# Patient Record
Sex: FEMALE | NOT HISPANIC OR LATINO | Employment: FULL TIME | URBAN - METROPOLITAN AREA
[De-identification: names, ages, dates, MRNs, and addresses within clinical notes are randomized per-mention and may not be internally consistent; named-entity substitution may affect disease eponyms.]

---

## 2023-04-18 PROBLEM — F40.228 AEROPHOBIA: Status: ACTIVE | Noted: 2023-04-18

## 2023-04-18 PROBLEM — O09.899 SHORT INTERVAL BETWEEN PREGNANCIES AFFECTING PREGNANCY, ANTEPARTUM (HHS-HCC): Status: ACTIVE | Noted: 2023-04-18

## 2023-04-18 PROBLEM — J02.9 SORE THROAT: Status: RESOLVED | Noted: 2023-04-18 | Resolved: 2023-04-18

## 2023-04-18 PROBLEM — O24.419 GESTATIONAL DIABETES MELLITUS (GDM) (HHS-HCC): Status: ACTIVE | Noted: 2023-04-18

## 2023-04-18 PROBLEM — J01.90 ACUTE SINUSITIS: Status: RESOLVED | Noted: 2023-04-18 | Resolved: 2023-04-18

## 2023-04-18 PROBLEM — R05.8 POST-VIRAL COUGH SYNDROME: Status: ACTIVE | Noted: 2023-04-18

## 2023-04-18 PROBLEM — O34.219 PREVIOUS CESAREAN DELIVERY AFFECTING PREGNANCY (HHS-HCC): Status: ACTIVE | Noted: 2023-04-18

## 2023-04-18 PROBLEM — J32.9 SINUSITIS: Status: RESOLVED | Noted: 2023-04-18 | Resolved: 2023-04-18

## 2023-04-18 RX ORDER — CETIRIZINE HYDROCHLORIDE 10 MG/1
1 TABLET ORAL NIGHTLY
COMMUNITY
Start: 2022-09-26

## 2023-04-18 RX ORDER — ALPRAZOLAM 0.5 MG/1
TABLET ORAL
COMMUNITY
Start: 2021-07-06

## 2023-04-19 ENCOUNTER — OFFICE VISIT (OUTPATIENT)
Dept: PRIMARY CARE | Facility: CLINIC | Age: 38
End: 2023-04-19
Payer: COMMERCIAL

## 2023-04-19 VITALS
HEART RATE: 79 BPM | DIASTOLIC BLOOD PRESSURE: 62 MMHG | BODY MASS INDEX: 22.66 KG/M2 | HEIGHT: 65 IN | SYSTOLIC BLOOD PRESSURE: 118 MMHG | OXYGEN SATURATION: 99 % | WEIGHT: 136 LBS | TEMPERATURE: 96.9 F

## 2023-04-19 DIAGNOSIS — R59.0 ANTERIOR CERVICAL LYMPHADENOPATHY: Primary | ICD-10-CM

## 2023-04-19 PROCEDURE — 3078F DIAST BP <80 MM HG: CPT | Performed by: INTERNAL MEDICINE

## 2023-04-19 PROCEDURE — 99212 OFFICE O/P EST SF 10 MIN: CPT | Performed by: INTERNAL MEDICINE

## 2023-04-19 PROCEDURE — 3074F SYST BP LT 130 MM HG: CPT | Performed by: INTERNAL MEDICINE

## 2023-04-19 RX ORDER — DIPHENHYDRAMINE HCL 25 MG
25 TABLET ORAL NIGHTLY PRN
Qty: 30 TABLET | Refills: 0 | Status: SHIPPED | OUTPATIENT
Start: 2023-04-19 | End: 2023-05-19

## 2023-04-19 RX ORDER — FERROUS SULFATE 325(65) MG
65 TABLET ORAL
COMMUNITY

## 2023-04-19 ASSESSMENT — ENCOUNTER SYMPTOMS
ABDOMINAL PAIN: 0
PHOTOPHOBIA: 0
CONSTIPATION: 0
FACIAL SWELLING: 0
WHEEZING: 0
SPEECH DIFFICULTY: 0
POLYPHAGIA: 0
SLEEP DISTURBANCE: 0
CHEST TIGHTNESS: 0
APPETITE CHANGE: 0
SORE THROAT: 0
FEVER: 0
WOUND: 0
NAUSEA: 0
WEAKNESS: 0
FLANK PAIN: 0
CONFUSION: 0
BLOOD IN STOOL: 0
FACIAL ASYMMETRY: 0
TROUBLE SWALLOWING: 0
UNEXPECTED WEIGHT CHANGE: 0
HALLUCINATIONS: 0
STRIDOR: 0
POLYDIPSIA: 0
RHINORRHEA: 0
ACTIVITY CHANGE: 0
EYE PAIN: 0
SHORTNESS OF BREATH: 0
DYSURIA: 0
VOMITING: 0
PALPITATIONS: 0
DIZZINESS: 0
FATIGUE: 0
NUMBNESS: 0
ADENOPATHY: 1
SINUS PAIN: 0
HEADACHES: 0
VOICE CHANGE: 0
NERVOUS/ANXIOUS: 0

## 2023-04-19 ASSESSMENT — PAIN SCALES - GENERAL: PAINLEVEL: 0-NO PAIN

## 2023-04-19 ASSESSMENT — PATIENT HEALTH QUESTIONNAIRE - PHQ9
SUM OF ALL RESPONSES TO PHQ9 QUESTIONS 1 AND 2: 0
2. FEELING DOWN, DEPRESSED OR HOPELESS: NOT AT ALL
1. LITTLE INTEREST OR PLEASURE IN DOING THINGS: NOT AT ALL

## 2023-04-19 NOTE — PROGRESS NOTES
"Subjective   Patient ID: Sheeba Bruno is a 37 y.o. female who presents for Swollen Glands (Right sided swelling for the last month ).    HPI   37 year old female presented to the office for swelling of right side of neck approximately which started 5- 6 weeks ago following a sore throat but it never resolved and on and off its slightly painful.    Review of Systems   Constitutional:  Negative for activity change, appetite change, fatigue, fever and unexpected weight change.   HENT:  Negative for dental problem, ear discharge, facial swelling, hearing loss, nosebleeds, postnasal drip, rhinorrhea, sinus pain, sore throat, trouble swallowing and voice change.    Eyes:  Negative for photophobia, pain and visual disturbance.   Respiratory:  Negative for chest tightness, shortness of breath, wheezing and stridor.    Cardiovascular:  Negative for chest pain, palpitations and leg swelling.   Gastrointestinal:  Negative for abdominal pain, blood in stool, constipation, nausea and vomiting.   Endocrine: Negative for polydipsia, polyphagia and polyuria.   Genitourinary:  Negative for decreased urine volume, dyspareunia, dysuria, flank pain and urgency.   Skin:  Negative for rash and wound.   Allergic/Immunologic: Negative for environmental allergies and food allergies.   Neurological:  Negative for dizziness, facial asymmetry, speech difficulty, weakness, numbness and headaches.   Hematological:  Positive for adenopathy.   Psychiatric/Behavioral:  Negative for behavioral problems, confusion, hallucinations and sleep disturbance. The patient is not nervous/anxious.      Objective   /62   Pulse 79   Temp 36.1 °C (96.9 °F)   Ht 1.651 m (5' 5\")   Wt 61.7 kg (136 lb)   SpO2 99%   BMI 22.63 kg/m²     Physical Exam  Constitutional:       General: She is not in acute distress.     Appearance: Normal appearance. She is normal weight. She is not ill-appearing or toxic-appearing.   HENT:      Mouth/Throat:      Mouth: " Mucous membranes are moist.   Eyes:      General:         Right eye: No discharge.         Left eye: No discharge.      Conjunctiva/sclera: Conjunctivae normal.      Pupils: Pupils are equal, round, and reactive to light.   Neck:      Vascular: No carotid bruit.   Cardiovascular:      Rate and Rhythm: Normal rate and regular rhythm.      Pulses: Normal pulses.      Heart sounds: Normal heart sounds. No murmur heard.     No friction rub.   Pulmonary:      Effort: Pulmonary effort is normal. No respiratory distress.      Breath sounds: Normal breath sounds. No wheezing.   Musculoskeletal:         General: Normal range of motion.      Cervical back: Tenderness present.   Lymphadenopathy:      Cervical: Cervical adenopathy present.   Skin:     General: Skin is warm.   Neurological:      General: No focal deficit present.      Mental Status: She is alert and oriented to person, place, and time.      Cranial Nerves: No cranial nerve deficit.   Psychiatric:         Mood and Affect: Mood normal.         Behavior: Behavior normal.         Thought Content: Thought content normal.         Judgment: Judgment normal.       Assessment/Plan   Problem List Items Addressed This Visit    None  Visit Diagnoses       Anterior cervical lymphadenopathy    -  Primary    Relevant Medications    diphenhydrAMINE (Sominex) 25 mg tablet     Return to clinic at your next office visit in 2 weeks for follow up on anterior cervical lymphadenopathy.

## 2023-05-04 ENCOUNTER — OFFICE VISIT (OUTPATIENT)
Dept: PRIMARY CARE | Facility: CLINIC | Age: 38
End: 2023-05-04
Payer: COMMERCIAL

## 2023-05-04 VITALS
OXYGEN SATURATION: 98 % | WEIGHT: 131 LBS | TEMPERATURE: 96.7 F | BODY MASS INDEX: 21.83 KG/M2 | SYSTOLIC BLOOD PRESSURE: 110 MMHG | HEART RATE: 81 BPM | DIASTOLIC BLOOD PRESSURE: 68 MMHG | HEIGHT: 65 IN

## 2023-05-04 DIAGNOSIS — O24.410 DIET CONTROLLED GESTATIONAL DIABETES MELLITUS (GDM), ANTEPARTUM (HHS-HCC): ICD-10-CM

## 2023-05-04 DIAGNOSIS — I88.9 CERVICAL LYMPHADENITIS: Primary | ICD-10-CM

## 2023-05-04 PROCEDURE — 99213 OFFICE O/P EST LOW 20 MIN: CPT | Performed by: FAMILY MEDICINE

## 2023-05-04 PROCEDURE — 3074F SYST BP LT 130 MM HG: CPT | Performed by: FAMILY MEDICINE

## 2023-05-04 PROCEDURE — 3078F DIAST BP <80 MM HG: CPT | Performed by: FAMILY MEDICINE

## 2023-05-04 ASSESSMENT — PAIN SCALES - GENERAL: PAINLEVEL: 0-NO PAIN

## 2023-05-04 NOTE — PROGRESS NOTES
Subjective   Patient ID: Sheeba Bruno is a 37 y.o. female.    Patient is here for follow-up. She has had a few pregnancies back to back.  She has a history of gestational diabetes.  She feels well today.  She has a history of very mild anxiety and takes about 15 alprazolam a year.  She does not have daily anxiety it just hits every once in a while.  She does not need a refill.  She has no chest pain or shortness of breath.  She has a history of swollen lymph node in her neck and saw Dr. Pantoja a couple weeks ago.  It has not gone away.  There is no fever, chills or night sweats.        Review of Systems   Constitutional:  Negative for fatigue, fever and unexpected weight change.   HENT:  Negative for congestion, ear pain, hearing loss, sore throat and trouble swallowing.    Eyes:  Negative for pain and visual disturbance.   Respiratory:  Negative for cough and shortness of breath.    Cardiovascular:  Negative for chest pain, palpitations and leg swelling.   Gastrointestinal:  Negative for abdominal pain, blood in stool, diarrhea, nausea and vomiting.   Genitourinary:  Negative for dysuria, frequency, hematuria and urgency.   Musculoskeletal:  Negative for joint swelling.   Skin:  Negative for pallor and rash.   Neurological:  Negative for dizziness, syncope, weakness, numbness and headaches.   Psychiatric/Behavioral:  Negative for confusion, decreased concentration, hallucinations and suicidal ideas.      Vitals:    05/04/23 1521   BP: 110/68   Pulse: 81   Temp: 35.9 °C (96.7 °F)   SpO2: 98%      Objective   Physical Exam  Constitutional:       Appearance: Normal appearance.   Cardiovascular:      Rate and Rhythm: Normal rate and regular rhythm.      Heart sounds: Normal heart sounds.   Pulmonary:      Effort: Pulmonary effort is normal.      Breath sounds: Normal breath sounds.   Musculoskeletal:      Cervical back: Neck supple.   Lymphadenopathy:      Cervical: Cervical adenopathy present.   Skin:     General:  Skin is warm and dry.   Neurological:      General: No focal deficit present.      Mental Status: She is alert.   Psychiatric:         Mood and Affect: Mood normal.         Speech: Speech normal.         Behavior: Behavior normal.         Cognition and Memory: Cognition normal.         Assessment/Plan   Diagnoses and all orders for this visit:  Cervical lymphadenitis  -     Referral to ENT; Future  -     CT soft tissue neck w and wo IV contrast; Future  Diet controlled gestational diabetes mellitus (GDM), antepartum

## 2023-05-10 ENCOUNTER — APPOINTMENT (OUTPATIENT)
Dept: PRIMARY CARE | Facility: CLINIC | Age: 38
End: 2023-05-10
Payer: COMMERCIAL

## 2023-05-16 ASSESSMENT — ENCOUNTER SYMPTOMS
PALPITATIONS: 0
EYE PAIN: 0
DIARRHEA: 0
NAUSEA: 0
FEVER: 0
BLOOD IN STOOL: 0
DIZZINESS: 0
WEAKNESS: 0
UNEXPECTED WEIGHT CHANGE: 0
SORE THROAT: 0
NUMBNESS: 0
JOINT SWELLING: 0
DYSURIA: 0
COUGH: 0
SHORTNESS OF BREATH: 0
VOMITING: 0
DECREASED CONCENTRATION: 0
FATIGUE: 0
ABDOMINAL PAIN: 0
FREQUENCY: 0
HALLUCINATIONS: 0
HEADACHES: 0
CONFUSION: 0
HEMATURIA: 0
TROUBLE SWALLOWING: 0

## 2023-05-16 NOTE — PATIENT INSTRUCTIONS
It was nice to see you today!  Discussed current concerns and addressed   Reviewed recent labs and diagnostics  Reviewed medications list  Continue to eat a healthy diet, exercise at least 3 times a week or more  Plan and follow up discussed  Will obtain CT of the neck, if anything is suspicious will send to ENT for biopsy.  Check CBC.  For any further information related to your condition, copy and paste or go to [email or web address]

## 2023-06-13 DIAGNOSIS — B37.9 YEAST INFECTION: ICD-10-CM

## 2023-06-13 RX ORDER — FLUCONAZOLE 150 MG/1
TABLET ORAL
Qty: 3 TABLET | Refills: 1 | Status: SHIPPED | OUTPATIENT
Start: 2023-06-13

## 2023-06-13 RX ORDER — FLUCONAZOLE 150 MG/1
150 TABLET ORAL ONCE
COMMUNITY
End: 2023-06-13 | Stop reason: SDUPTHER

## 2023-06-13 NOTE — TELEPHONE ENCOUNTER
Pt recently saw ENT for lump on throat. Given antibiotic. Believes she has a yeast infection. C/o itching and burning in vaginal area.  
1.69

## 2023-12-22 ENCOUNTER — TELEMEDICINE (OUTPATIENT)
Dept: PRIMARY CARE | Facility: CLINIC | Age: 38
End: 2023-12-22
Payer: COMMERCIAL

## 2023-12-22 DIAGNOSIS — J01.00 ACUTE NON-RECURRENT MAXILLARY SINUSITIS: Primary | ICD-10-CM

## 2023-12-22 PROCEDURE — 99213 OFFICE O/P EST LOW 20 MIN: CPT | Performed by: FAMILY MEDICINE

## 2023-12-22 RX ORDER — AMOXICILLIN 875 MG/1
875 TABLET, FILM COATED ORAL 2 TIMES DAILY
Qty: 20 TABLET | Refills: 0 | Status: SHIPPED | OUTPATIENT
Start: 2023-12-22 | End: 2024-01-01

## 2023-12-22 RX ORDER — FLUCONAZOLE 150 MG/1
TABLET ORAL
Qty: 3 TABLET | Refills: 0 | Status: SHIPPED | OUTPATIENT
Start: 2023-12-22

## 2023-12-22 ASSESSMENT — ENCOUNTER SYMPTOMS
NEUROLOGICAL NEGATIVE: 1
SINUS PRESSURE: 1
FATIGUE: 1
BLOOD IN STOOL: 0
CHILLS: 1
CHEST TIGHTNESS: 0
ABDOMINAL PAIN: 0
COUGH: 1
WHEEZING: 0
VOMITING: 0
DIARRHEA: 0
RHINORRHEA: 1
NAUSEA: 0
EYE DISCHARGE: 0
SINUS PAIN: 1
SHORTNESS OF BREATH: 0

## 2023-12-22 NOTE — PATIENT INSTRUCTIONS
You have a sinus infection.  I am giving you an antibiotic to clear it out.  Over-the-counter Mucinex works well along with nasal saline and Flonase.  Tylenol or Advil as tolerated for pain.  Rest, drink fluids and please follow-up if symptoms persist or worsen.  I am giving you a Diflucan just in case she get a yeast infection.  Eat yogurt daily.

## 2023-12-22 NOTE — PROGRESS NOTES
Subjective   Patient ID: Sheeba Bruno is a 38 y.o. female.    Patient has had a cold for about 11 days.  Started out with clear drainage, mild cough and myalgias.  That all got better but now it has localized into her left maxillary sinus and she has pain and profuse thick drainage coming from the nose.  She is not a smoker.  No respiratory distress.        Review of Systems   Constitutional:  Positive for chills and fatigue.   HENT:  Positive for congestion, postnasal drip, rhinorrhea, sinus pressure and sinus pain. Negative for ear discharge.    Eyes:  Negative for discharge and visual disturbance.   Respiratory:  Positive for cough. Negative for chest tightness, shortness of breath and wheezing.    Cardiovascular:  Negative for chest pain.   Gastrointestinal:  Negative for abdominal pain, blood in stool, diarrhea, nausea and vomiting.   Neurological: Negative.      There were no vitals filed for this visit.   Objective   Physical Exam  Constitutional:       Appearance: Normal appearance.      Comments: Sounds congested   Neurological:      Mental Status: She is alert.         Assessment/Plan   Diagnoses and all orders for this visit:  Acute non-recurrent maxillary sinusitis  -     amoxicillin (Amoxil) 875 mg tablet; Take 1 tablet (875 mg) by mouth 2 times a day for 10 days.  -     fluconazole (Diflucan) 150 mg tablet; 1 p.o. for yeast infection.  May repeat in 3 days if no better.

## 2024-01-16 ENCOUNTER — OFFICE VISIT (OUTPATIENT)
Dept: PRIMARY CARE | Facility: CLINIC | Age: 39
End: 2024-01-16
Payer: COMMERCIAL

## 2024-01-16 VITALS
RESPIRATION RATE: 16 BRPM | WEIGHT: 126.9 LBS | BODY MASS INDEX: 21.12 KG/M2 | TEMPERATURE: 98.6 F | SYSTOLIC BLOOD PRESSURE: 102 MMHG | HEART RATE: 71 BPM | DIASTOLIC BLOOD PRESSURE: 68 MMHG | OXYGEN SATURATION: 96 %

## 2024-01-16 DIAGNOSIS — J01.00 ACUTE NON-RECURRENT MAXILLARY SINUSITIS: Primary | ICD-10-CM

## 2024-01-16 PROBLEM — G51.0 BELL'S PALSY: Status: ACTIVE | Noted: 2017-08-28

## 2024-01-16 PROBLEM — R59.1 LYMPHADENOPATHY: Status: ACTIVE | Noted: 2024-01-16

## 2024-01-16 PROBLEM — K21.9 ESOPHAGEAL REFLUX: Status: ACTIVE | Noted: 2018-02-06

## 2024-01-16 PROBLEM — Z86.32 HISTORY OF GESTATIONAL DIABETES MELLITUS (GDM): Status: ACTIVE | Noted: 2019-04-30

## 2024-01-16 PROCEDURE — 99213 OFFICE O/P EST LOW 20 MIN: CPT | Performed by: FAMILY MEDICINE

## 2024-01-16 PROCEDURE — 3078F DIAST BP <80 MM HG: CPT | Performed by: FAMILY MEDICINE

## 2024-01-16 PROCEDURE — 3074F SYST BP LT 130 MM HG: CPT | Performed by: FAMILY MEDICINE

## 2024-01-16 RX ORDER — AZITHROMYCIN 250 MG/1
TABLET, FILM COATED ORAL
Qty: 6 TABLET | Refills: 0 | Status: SHIPPED | OUTPATIENT
Start: 2024-01-16 | End: 2024-01-21

## 2024-01-16 ASSESSMENT — ENCOUNTER SYMPTOMS
EYE DISCHARGE: 0
SINUS PAIN: 1
NAUSEA: 0
COUGH: 1
SHORTNESS OF BREATH: 0
BLOOD IN STOOL: 0
ABDOMINAL PAIN: 0
SINUS PRESSURE: 1
RHINORRHEA: 1
NEUROLOGICAL NEGATIVE: 1
DIARRHEA: 0
CHEST TIGHTNESS: 0
FATIGUE: 1
WHEEZING: 0
CHILLS: 1
VOMITING: 0

## 2024-01-16 NOTE — PROGRESS NOTES
Subjective   Patient ID: Sheeba Bruno is a 38 y.o. female.    Patient comes in today because she has had upper respiratory symptoms.  She has significant sinus pressure, sore throat, congestion nasal drainage fatigue and malaise.  She has a history of lymphadenopathy and it gets extremely swollen on the right side of her neck when she gets ill.  She would like to get something before it gets worse.  She has 3 small children that she usually gets ill from frequently.  She does not smoke.        Review of Systems   Constitutional:  Positive for chills and fatigue.   HENT:  Positive for congestion, postnasal drip, rhinorrhea, sinus pressure and sinus pain. Negative for ear discharge.    Eyes:  Negative for discharge and visual disturbance.   Respiratory:  Positive for cough. Negative for chest tightness, shortness of breath and wheezing.    Cardiovascular:  Negative for chest pain.   Gastrointestinal:  Negative for abdominal pain, blood in stool, diarrhea, nausea and vomiting.   Neurological: Negative.      Vitals:    01/16/24 1438   BP: 102/68   Pulse: 71   Resp: 16   Temp: 37 °C (98.6 °F)   SpO2: 96%      Objective   Physical Exam    Assessment/Plan   Diagnoses and all orders for this visit:  Acute non-recurrent maxillary sinusitis  -     azithromycin (Zithromax) 250 mg tablet; Take 2 tablets (500 mg) by mouth once daily for 1 day, THEN 1 tablet (250 mg) once daily for 4 days. Take 2 tabs (500 mg) by mouth today, than 1 daily for 4 days..

## 2024-01-16 NOTE — PATIENT INSTRUCTIONS
I am giving you an antibiotic for sinus infection and anything else that may aggravate lymphadenopathy.  It still could be viral in which case she will have to ride it out but at least you will not get more ill than you already are.  Drink lots of fluids, over-the-counter remedies and let me know if symptoms persist or worsen.

## 2025-03-17 ENCOUNTER — TELEPHONE (OUTPATIENT)
Dept: PRIMARY CARE | Facility: CLINIC | Age: 40
End: 2025-03-17
Payer: COMMERCIAL

## 2025-03-17 NOTE — TELEPHONE ENCOUNTER
Spoke with patient-chest pains less but still there. Stating was intensifying earlier and consuming every thought. Patient sent to ER.

## 2025-03-17 NOTE — TELEPHONE ENCOUNTER
Sick x 4 days with flu-like symptoms including  Fever, cough and muscle aches. Starting to feel better today but is having a terrible pain in her chest that is intensifying. No appts avail today, ER?